# Patient Record
Sex: MALE | Race: BLACK OR AFRICAN AMERICAN | NOT HISPANIC OR LATINO | Employment: OTHER | URBAN - METROPOLITAN AREA
[De-identification: names, ages, dates, MRNs, and addresses within clinical notes are randomized per-mention and may not be internally consistent; named-entity substitution may affect disease eponyms.]

---

## 2019-02-13 ENCOUNTER — TELEPHONE (OUTPATIENT)
Dept: FAMILY MEDICINE | Facility: CLINIC | Age: 39
End: 2019-02-13

## 2025-07-01 ENCOUNTER — TELEPHONE (OUTPATIENT)
Dept: GASTROENTEROLOGY | Facility: CLINIC | Age: 45
End: 2025-07-01
Payer: MEDICARE

## 2025-07-01 NOTE — TELEPHONE ENCOUNTER
Copied from CRM #[id number]. Topic: Appointments - Appointment Access  >> Jul 1, 2025  1:22 PM Gavi wrote:  Type:  Needs Status     Who Called: Pt wife Jakob    Best Call Back Number: [phone]  Additional Information: Pt wife is calling to check the status of the referral to see if it was received

## 2025-07-02 ENCOUNTER — TELEPHONE (OUTPATIENT)
Dept: TRANSPLANT | Facility: CLINIC | Age: 45
End: 2025-07-02
Payer: MEDICARE

## 2025-07-02 NOTE — TELEPHONE ENCOUNTER
----- Message from [facility] sent at 7/2/2025  8:51 AM CDT -----    Hepatology referral received and scanned into media; pt chart sent to referral nurse for medical review.       Referring Provider: Bettie Hoff FNP-C  Phone: [phone]  Fax: [phone]  .

## 2025-07-07 ENCOUNTER — TELEPHONE (OUTPATIENT)
Dept: TRANSPLANT | Facility: CLINIC | Age: 45
End: 2025-07-07
Payer: MEDICARE

## 2025-07-07 NOTE — LETTER
July 7, 2025    ANAMIKA [address]   [address]  [facility] [address]      Dear ANAMIKA Krishnamurthy:    Your doctor has referred you to the [facility]. We are sending this letter to remind you to make an appointment with us to complete the referral process.     Please call us at [phone] to schedule an appointment. We look forward to seeing you soon.     If you received a call and have been scheduled, please disregard this letter.       Sincerely,        [facility]   [facility] [place] [address]  [phone]

## 2025-07-07 NOTE — TELEPHONE ENCOUNTER
Pt records reviewed.  Pt will be referred to [facility] due to HEP C   Initial referral received from Referring Provider: Bettie Hoff FNP-C   Phone: [phone]   Fax: [phone]   .   Referral letter sent to provider and patient.

## 2025-07-08 NOTE — TELEPHONE ENCOUNTER
Called the patient to schedule a hepatology consult from referral.  Scheduled to the next available appt 8/12/2025 with Ms. Scheuermann. Patient confirmed and agreed with the schedule.  Reminder letter mailed.

## 2025-08-12 ENCOUNTER — TELEPHONE (OUTPATIENT)
Dept: HEPATOLOGY | Facility: CLINIC | Age: 45
End: 2025-08-12
Payer: MEDICARE